# Patient Record
Sex: FEMALE | Race: WHITE
[De-identification: names, ages, dates, MRNs, and addresses within clinical notes are randomized per-mention and may not be internally consistent; named-entity substitution may affect disease eponyms.]

---

## 2018-05-04 ENCOUNTER — HOSPITAL ENCOUNTER (EMERGENCY)
Dept: HOSPITAL 68 - ERH | Age: 83
End: 2018-05-04
Payer: COMMERCIAL

## 2018-05-04 VITALS — BODY MASS INDEX: 27.21 KG/M2 | HEIGHT: 59 IN | WEIGHT: 135 LBS

## 2018-05-04 VITALS — DIASTOLIC BLOOD PRESSURE: 58 MMHG | SYSTOLIC BLOOD PRESSURE: 120 MMHG

## 2018-05-04 DIAGNOSIS — R07.89: Primary | ICD-10-CM

## 2018-05-04 LAB
ABSOLUTE GRANULOCYTE CT: 4.1 /CUMM (ref 1.4–6.5)
BASOPHILS # BLD: 0 /CUMM (ref 0–0.2)
BASOPHILS NFR BLD: 0.4 % (ref 0–2)
EOSINOPHIL # BLD: 0.1 /CUMM (ref 0–0.7)
EOSINOPHIL NFR BLD: 1.7 % (ref 0–5)
ERYTHROCYTE [DISTWIDTH] IN BLOOD BY AUTOMATED COUNT: 14 % (ref 11.5–14.5)
GRANULOCYTES NFR BLD: 68.3 % (ref 42.2–75.2)
HCT VFR BLD CALC: 41.7 % (ref 37–47)
LYMPHOCYTES # BLD: 1.3 /CUMM (ref 1.2–3.4)
MCH RBC QN AUTO: 28.1 PG (ref 27–31)
MCHC RBC AUTO-ENTMCNC: 32.2 G/DL (ref 33–37)
MCV RBC AUTO: 87.2 FL (ref 81–99)
MONOCYTES # BLD: 0.4 /CUMM (ref 0.1–0.6)
PLATELET # BLD: 223 /CUMM (ref 130–400)
PMV BLD AUTO: 8.5 FL (ref 7.4–10.4)
RED BLOOD CELL CT: 4.78 /CUMM (ref 4.2–5.4)
WBC # BLD AUTO: 6 /CUMM (ref 4.8–10.8)

## 2018-05-04 NOTE — RADIOLOGY REPORT
EXAMINATION:
XR PORTABLE CHEST
 
CLINICAL INFORMATION:
Chest discomfort.
 
COMPARISON:
Chest radiograph 02/14/2008.
 
TECHNIQUE:
Portable frontal view of the chest was obtained.
 
FINDINGS:
There is elevation of the right hemidiaphragm. There is mild bibasilar
atelectasis. There is no dense consolidation, edema, effusion, or
pneumothorax. The heart is normal in size. The osseous structures are grossly
intact. Numerous surgical clips are seen in the right axilla and breast.
 
IMPRESSION:
No active disease in the chest.

## 2018-06-07 ENCOUNTER — HOSPITAL ENCOUNTER (OUTPATIENT)
Dept: HOSPITAL 68 - ERH | Age: 83
Setting detail: OBSERVATION
LOS: 1 days | End: 2018-06-08
Attending: EMERGENCY MEDICINE | Admitting: EMERGENCY MEDICINE
Payer: COMMERCIAL

## 2018-06-07 VITALS — HEIGHT: 59 IN | BODY MASS INDEX: 25.2 KG/M2 | WEIGHT: 125 LBS

## 2018-06-07 DIAGNOSIS — R53.1: ICD-10-CM

## 2018-06-07 DIAGNOSIS — Z79.82: ICD-10-CM

## 2018-06-07 DIAGNOSIS — Z87.891: ICD-10-CM

## 2018-06-07 DIAGNOSIS — F03.90: ICD-10-CM

## 2018-06-07 DIAGNOSIS — R07.9: Primary | ICD-10-CM

## 2018-06-07 DIAGNOSIS — R42: ICD-10-CM

## 2018-06-07 LAB
ABSOLUTE GRANULOCYTE CT: 5.5 /CUMM (ref 1.4–6.5)
APTT BLD: 30 SEC (ref 25–37)
BASOPHILS # BLD: 0 /CUMM (ref 0–0.2)
BASOPHILS NFR BLD: 0.3 % (ref 0–2)
EOSINOPHIL # BLD: 0.1 /CUMM (ref 0–0.7)
EOSINOPHIL NFR BLD: 0.7 % (ref 0–5)
ERYTHROCYTE [DISTWIDTH] IN BLOOD BY AUTOMATED COUNT: 14.9 % (ref 11.5–14.5)
GRANULOCYTES NFR BLD: 76.5 % (ref 42.2–75.2)
HCT VFR BLD CALC: 39.9 % (ref 37–47)
LYMPHOCYTES # BLD: 1.1 /CUMM (ref 1.2–3.4)
MCH RBC QN AUTO: 28.5 PG (ref 27–31)
MCHC RBC AUTO-ENTMCNC: 32.9 G/DL (ref 33–37)
MCV RBC AUTO: 86.4 FL (ref 81–99)
MONOCYTES # BLD: 0.5 /CUMM (ref 0.1–0.6)
PLATELET # BLD: 226 /CUMM (ref 130–400)
PMV BLD AUTO: 8.4 FL (ref 7.4–10.4)
PROTHROMBIN TIME: 11.7 SEC (ref 9.4–12.5)
RED BLOOD CELL CT: 4.61 /CUMM (ref 4.2–5.4)
WBC # BLD AUTO: 7.2 /CUMM (ref 4.8–10.8)

## 2018-06-07 PROCEDURE — G0378 HOSPITAL OBSERVATION PER HR: HCPCS

## 2018-06-07 NOTE — RADIOLOGY REPORT
EXAMINATION:
XR CHEST
 
CLINICAL INFORMATION:
Chest pain.
 
COMPARISON:
Chest x-ray 05/04/2018
 
TECHNIQUE:
2 views of the chest were obtained.
 
FINDINGS:
Lungs are clear. No pulmonary vascular congestion. There is no pleural
effusion. The heart size is normal. The cardiac and mediastinal contours are
normal. There are calcifications of the thoracic aorta. There are multilevel
degenerative changes of dorsal spine.
There are surgical clips in the right axilla.
 
IMPRESSION:
No acute abnormality of the chest.

## 2018-06-07 NOTE — CT SCAN REPORT
EXAMINATION:
CT HEAD WITHOUT CONTRAST
 
CLINICAL INFORMATION:
Rule out ICH. Left-sided facial droop.
 
COMPARISON:
None.
 
TECHNIQUE:
Contiguous axial imaging was performed from the skull base to vertex without
intravenous administration of contrast.
 
DLP:
12 mGy-cm.
 
FINDINGS:
There is no intracranial hemorrhage, large infarction, or mass lesion. There
is no dense vessel sign or obscuration of the basal ganglia or insular
ribbon. There is no extra-axial collection. There is moderate scattered
hypoattenuation in the bilateral cerebral white matter, which is nonspecific
but likely reflects small vessel disease. There is mild diffuse brain
parenchymal volume loss with prominence of the ventricles and sulci.
 
The visualized paranasal sinuses are clear. The mastoids and middle ear
cavities are clear. There are atherosclerotic calcification of the carotid
siphons and vertebral arteries.
 
IMPRESSION:
- No CT evidence of acute large territory infarction or hemorrhage.
- Moderate small vessel ischemic changes and mild diffuse brain parenchymal
volume loss.

## 2018-06-08 VITALS — DIASTOLIC BLOOD PRESSURE: 72 MMHG | SYSTOLIC BLOOD PRESSURE: 138 MMHG

## 2018-12-08 NOTE — ED GENERAL ADULT
**See Addendum**
History of Present Illness
 
General
Chief Complaint: Dizziness
Stated Complaint: "CHEST FEELS HEAVY" DIZZINESS & CONFUSED
Source: patient
Exam Limitations: no limitations
 
Vital Signs & Intake/Output
Vital Signs & Intake/Output
 Vital Signs
 
 
Date Time Temp Pulse Resp B/P B/P Pulse O2 O2 Flow FiO2
 
     Mean Ox Delivery Rate 
 
 0931 96.7 53 18 140/62  98 Room Air  
 
 0735 98.3 56 18 165/72  96 Room Air  
 
 0008 97.9 56 18 141/64  96   
 
 1702  61 20 155/65  99 Room Air  
 
 1539       Room Air  
 
 1504  73 20 188/73  98 Room Air  
 
/ 1439 98.7 67 20 144/74  97 Room Air  
 
 
 ED Intake and Output
 
 
  0000  1200
 
Intake Total  
 
Output Total  
 
Balance  
 
   
 
Patient 125 lb 
 
Weight  
 
Weight Reported by Patient 
 
Measurement  
 
Method  
 
 
 
Allergies
Coded Allergies:
No Known Drug Allergies (Intermediate, NONE 18)
 
Reconcile Medications
Aspirin (Aspirin*) 81 MG TAB.CHEW   1 TAB PO DAILY PRN HEART  (Reported)
Atorvastatin Calcium 10 MG TABLET   1 TAB PO DAILY CHOLESTEROL  (Reported)
Diltiazem HCl (Diltiazem 24HR ER) 120 MG CAP.ER.24H   1 CAP PO DAILY HEART  (
Reported)
Donepezil HCl 10 MG TABLET   1 TAB PO QPM DEMENTIA  (Reported)
Melatonin 3 MG TABLET   1 TAB PO QPM PRN SLEEP  (Reported)
Memantine HCl (Namenda XR) 28 MG CAP.SPR.24   1 CAP PO DAILY DEMENTIA  (Reported
)
Metoprolol Succinate 25 MG TAB   0.5 TAB PO DAILY HEART  (Reported)
 
Triage Note:
PT TO ED C/O HEAVY FEELING TO CHEST SINCE THIS
AM. PT NOTED WITH LEFT SIDED FACIAL DROOP. C/O
FEELING DIZZY AND CONFUSED. RAEANN VILLAREAL IN EKG [facility]
FOR EVAL.
Triage Nurses Notes Reviewed? yes
Onset: Gradual
Duration: hour(s):
Timing: single episode today
Injury Environment: home
Severity: moderate
HPI:
82YO female with hx of dementia presents to ED complaining of "chest discomfort
" with dizziness and weakness at home.  HPI is limited due to patient's 
dementia.  Patient's brother states that visiting nurse was visiting the patient
and her home earlier today when patient began complaining of dizziness, weakness
, him for.  Brother states that patient often complains of chest discomfort 
which her cardiologist has told them is related to her aortic stenosis.  Patient
has had valvular disease assessed with cardiologist and was told that they do 
not plan on performing any surgery.  Brother states that the chest discomfort 
she is complaining of a similar to previous episodes.  They deny fall, head 
trauma, vomiting, syncope.  Patient currently denies dizziness and chest pain.
(Alis Reyes)
 
Past History
 
Travel History
Traveled to [place] past 21 day No
 
Medical History
Any Pertinent Medical History? see below for history
Neurological: dementia
EENT: NONE
Cardiovascular: heart valve narrowing
Respiratory: NONE
Gastrointestinal: NONE
Hepatic: NONE
Renal: NONE
Musculoskeletal: NONE
Psychiatric: NONE
Endocrine: NONE
 
Surgical History
Surgical History: non-contributory
 
Psychosocial History
Who do you live with Patient/Self
What is your primary language English
Tobacco Use: Quit >30 days ago
ETOH Use: denies use
Illicit Drug Use: denies illicit drug use
 
Family History
Hx Contributory? No
(Alis Reyes)
 
Review of Systems
 
Review of Systems
Constitutional:
Reports: see HPI. 
EENTM:
Reports: no symptoms. 
Respiratory:
Reports: no symptoms. 
Cardiovascular:
Reports: see HPI. 
GI:
Reports: no symptoms. 
Genitourinary:
Reports: no symptoms. 
Musculoskeletal:
Reports: no symptoms. 
Skin:
Reports: no symptoms. 
Neurological/Psychological:
Reports: see HPI. 
Hematologic/Endocrine:
Reports: no symptoms. 
Immunologic/Allergic:
Reports: no symptoms. 
All Other Systems: Reviewed and Negative
(Alis Reyes)
 
Physical Exam
 
Physical Exam
General Appearance: well developed/nourished, no apparent distress, alert, awake
Head: atraumatic, normal appearance
Eyes:
Bilateral: normal appearance. 
Ears, Nose, Throat: hearing grossly normal
Neck: normal inspection, supple, full range of motion
Respiratory: no respiratory distress, corse breath sounds bilateral lower lobes
Cardiovascular: regular rate/rhythm, normal peripheral pulses
Peripheral Pulses:
2+ radial (R), 2+ radial (L)
Gastrointestinal: normal bowel sounds, soft, non-tender, no organomegaly
Back: normal inspection, normal range of motion
Extremities: normal inspection, normal range of motion
Neurologic/Psych: awake, alert, oriented x 3, left sided facial droop involving 
forehead, otherwise neurologically intact, strength 5/5 equal bilateral upper 
extremities
Skin: intact, normal color, warm/dry
 
Core Measures
ACS in differential dx? Yes
CVA/TIA Diagnosis: No
Sepsis Present: No
Sepsis Focused Exam Completed? No
(Katerina CARY,Alis Huffman)
 
Progress
Differential Diagnoses
I considered the following diagnoses in my evaluation of the patient: [Acute 
coronary syndrome, aortic stenosis, CVA/TIA, Bell's palsy, benign positional 
vertigo, ICH, electrolyte abnormality, anemia]
 
Plan of Care:
 Orders
 
 
Procedure Date/time Status
 
Intake & Output  0000 Active
 
Regular Diet  D Active
 
PT Evaluate & Treat 1942 Active
 
CASE MANAGEMENT CONSULT 1942 Active
 
OXYGEN SETUP (GEN) 1914 Active
 
Saline Lock 1914 Active
 
Place in observation 1914 Active
 
Patient Data 1914 Active
 
Vital Signs 1914 Active
 
Activity/Ambulation 1914 Active
 
Code Status 1914 Active
 
TROPONIN LEVEL  1822 Complete
 
EKG  1822 Active
 
FingerStick- Glucose  1527 Active
 
NIH Stroke Scale  1500 Active
 
TROPONIN LEVEL  1437 Complete
 
PARTIAL THROMBOPLASTIN TIME  143 Complete
 
PROTHROMBIN TIME  1437 Complete
 
COMPREHENSIVE METABOLIC PANEL  1437 Complete
 
CBC WITHOUT DIFFERENTIAL  1437 Complete
 
EKG  1429 Active
 
 
 Laboratory Tests
 
 
 
18 1839:
Troponin I < 0.01
 
18 1445:
Anion Gap 12, Estimated GFR > 60, BUN/Creatinine Ratio 28.6  H, Glucose 119  H, 
Calcium 9.3, Total Bilirubin 0.5, AST 24, ALT 23, Alkaline Phosphatase 65, 
Troponin I < 0.01, Total Protein 6.7, Albumin 3.9, Globulin 2.8, Albumin/
Globulin Ratio 1.4, PT 11.7, INR 1.07, APTT 30, CBC w Diff NO MAN DIFF REQ, RBC 
4.61, MCV 86.4, MCH 28.5, MCHC 32.9  L, RDW 14.9  H, MPV 8.4, Gran % 76.5  H, 
Lymphocytes % 16.0  L, Monocytes % 6.5, Eosinophils % 0.7, Basophils % 0.3, 
Absolute Granulocytes 5.5, Absolute Lymphocytes 1.1  L, Absolute Monocytes 0.5, 
Absolute Eosinophils 0.1, Absolute Basophils 0
 
When patient arrives in emergency department it was noted that she had left 
facial droop involving the eyebrow.  I saw the patient in triage at this time.  
Patient states she did not know she had a facial droop.  For this reason we sent
her to head CT scan.  Head CT scan is within normal limits.
 
After discussing the patient's history with her brother he reports a history of 
Bell's palsy with left-sided facial droop for the past 10 years.  This and this 
finding was suspicion for acute intracranial pathology/CVA.
 
Patient's EKG is stable compared to previous studies, troponin enzymes negative.
 Patient's labs are within normal limits.  She is ambulatory here with a steady 
gait and without dizziness.  She has no orthostatic hypotension.  Others are 
concerned because she lives at home alone, he does not feel it is safe for her 
to go home given her symptoms from earlier today.  Patient also has dementia at 
baseline and gives a poor history.  For this reason patient to stay in emergency
department for serial EKGs, troponins, physical therapy evaluation tomorrow.  
Discussed with case management who recommended ED observation.
 
The patient was signed out to Dr. Raymundo for ED obs.
Diagnostic Imaging:
Viewed by Me: Radiology Read, CT Scan.  Discussed w/RAD: Radiology Read, CT 
Scan. 
Radiology Impression: PATIENT: SERENITY TRUJILLO  MEDICAL RECORD NO: [id number] 
PRESENT AGE: 83  PATIENT ACCOUNT NO: [id number] : [birth year]  LOCATION: [facility] 
ORDERING PHYSICIAN: Alis CARY     SERVICE DATE:  EXAM 
TYPE: CAT - CT HEAD WO IV CONTRAST EXAMINATION: CT HEAD WITHOUT CONTRAST 
CLINICAL INFORMATION: Rule out ICH. Left-sided facial droop. COMPARISON: None. 
TECHNIQUE: Contiguous axial imaging was performed from the skull base to vertex 
without intravenous administration of contrast. DLP: 12 mGy-cm. FINDINGS: There 
is no intracranial hemorrhage, large infarction, or mass lesion. There is no 
dense vessel sign or obscuration of the basal ganglia or insular ribbon. There 
is no extra-axial collection. There is moderate scattered hypoattenuation in the
bilateral cerebral white matter, which is nonspecific but likely reflects small 
vessel disease. There is mild diffuse brain parenchymal volume loss with 
prominence of the ventricles and sulci. The visualized paranasal sinuses are 
clear. The mastoids and middle ear cavities are clear. There are atherosclerotic
calcification of the carotid siphons and vertebral arteries. IMPRESSION: - No CT
evidence of acute large territory infarction or hemorrhage. - Moderate small 
vessel ischemic changes and mild diffuse brain parenchymal volume loss. DICTATED
BY: Carolyn Grenee MD  DATE/TIME DICTATED:18 :
RAD.IRWIN  DATE/TIME TRANSCRIBED:18 CONFIDENTIAL, DO NOT COPY 
WITHOUT APPROPRIATE AUTHORIZATION.  <Electronically signed in Other Vendor 
System>                                                                         
              SIGNED BY: Carolyn Greene MD 18 1512
CXR Impression: PATIENT: SERENITY TRUJILLO  MEDICAL RECORD NO: [id number] PRESENT 
AGE: 83  PATIENT ACCOUNT NO: [id number] : [birth year]  LOCATION: [facility] ORDERING 
PHYSICIAN: Alis CARY     SERVICE DATE:  EXAM TYPE: RAD -
XRY-CHEST XRAY, TWO VIEWS EXAMINATION: XR CHEST CLINICAL INFORMATION: Chest 
pain. COMPARISON: Chest x-ray 2018 TECHNIQUE: 2 views of the chest were 
obtained. FINDINGS: Lungs are clear. No pulmonary vascular congestion. There is 
no pleural effusion. The heart size is normal. The cardiac and mediastinal 
contours are normal. There are calcifications of the thoracic aorta. There are 
multilevel degenerative changes of dorsal spine. There are surgical clips in the
right axilla. IMPRESSION: No acute abnormality of the chest. DICTATED BY: Emerson Shin MD  DATE/TIME DICTATED:18 :RAD.IRWIN  DATE/
TIME TRANSCRIBED:18 CONFIDENTIAL, DO NOT COPY WITHOUT APPROPRIATE 
AUTHORIZATION.  <Electronically signed in Other Vendor System>                  
                                                                     SIGNED BY: 
Emerson Shin MD 18
Initial ED EKG: sinus rhythm @59bpm, PACs, nonspecific ST changes
Prior EKG: unchanged (18)
Hand-Off
   Endorsed To:
Clarence Raymundo MD
   Endorsed Time: 
   Pending: EKG, labs (ED obs)
(Katerina CARY,Alis Huffman)
Hand-Off
   Endorsed To:
Curry Jerome MD
   Endorsed Time: 700
   Pending: other (case mgmt, pt)
(Clarence Raymundo MD)
Comments:
2018 9:16:39 AM patient signed out to me by Dr. Rayumndo at shift change over.
 Serenity is currently without complaint and is eating breakfast.  I have 
discussed her case with case management regarding the possibility of homecare 
enhancement.  Disposition is pending.
 
2018 10:05:29 AM I have reevaluated Serneity.  She has no complaint at this 
time.  She is oriented to person place and time.  I suspect she is oriented to 
the current  although couldn't state his name 
specifically.  She states she lives in her own home by herself but is planning 
on converting over to assisted living with the help of her brother.  She states 
her brother should be available to pick her up.  Although the patient has a 
history of dementia and is currently taking Namenda, I feel she is competent for
medical decision making at this time.  She has declined evaluation by case 
management for the possibility of a home health aide.  Overall I feel she is 
stable for discharge.  She has a primary care physician with whom she can follow
-up.
(Queenie GENTILE,Curry HORTA)
 
Departure
 
Departure
Condition: Stable
Clinical Impression
Primary Impression: Chest pain
Qualifiers:  Chest pain type: unspecified Qualified Code: R07.9 - Chest pain, 
unspecified
Secondary Impressions: Dizziness, Weakness
Referrals:
Harman Barnes MD (PCP/Family)
 
Departure Forms:
Customer Survey
General Discharge Information
 
Observation Note
Spoke With:
Clarence Raymundo MD
Physician Advisor Notified: CURRY LOUIS DO
Place Patient In: ED Observation
Rationale for Observation:
My rational for observation is as follows [patient has chest pain requiring 
serial EKGs, troponins, telemetry monitoring, weakness and dizziness requiring 
physical therapy evaluation, premature discharge medically unsafe.].
 
(Katerina CARY,Alis Huffman)
 
PA/NP Co-Sign Statement
Statement:
ED Attending supervision documentation-
 
x I saw and evaluated the patient. I have also reviewed all the pertinent lab 
results and diagnostic results. I agree with the findings and the plan of care 
as documented in the PA's/NP's documentation. 
 
[] I have reviewed the ED Record and agree with the PA's/NP's documentation.
 
[] Additions or exceptions (if any) to the PAs/NP's note and plan are 
summarized below:
[]
 
(Clarence Raymundo MD)
 
Departure
Disposition: HOME OR SELF CARE
Additional Instructions:
Your emergency department evaluation does not reveal any significant findings.  
Follow-up with your primary care physician on Monday for reevaluation.  Return 
if any concerns or sudden worsening.
 
Please note that there might be incidental findings in your evaluation that are 
unrelated to the current emergency department visit.  Please notify your primary
care doctor about this emergency department visit in order to obtain and review 
all of the testing performed so that these incidental findings can be monitored 
as needed.
 
If you had an x-ray performed, please understand that some fractures or other 
findings may not be seen on the initial set of x-rays.  If your symptoms persist
you might need a repeat set of x-rays to check for such a fracture.
 
If you had a laceration evaluated, please understand that foreign bodies such as
glass or wood may not be visible to the naked eye or on plain x-rays.  If the 
wound becomes red, swollen, increasingly more painful or if there is any 
drainage from the wound, please have it reevaluated by a physician for the 
possibility of a retained foreign body.
 
*****If you're unable to follow up as outlined in the discharge instructions 
please return to the emergency department.******
 
Thank you for choosing the [facility] Emergency Department for your care.
It was a pleasure to serve you today.
 
Curry Jerome M.D.
[facility]
 
(Queenie GENTILE,Curry HORTA)
 
Critical Care Note
 
Critical Care Note
Critical Care Time: non-applicable
(Katerina CARY,Alis Huffman)
 
ED Attending Observation
Initial Observation Note:
I have seen and personally examined SERENITY TRUJILLO 
on 18 at 1914. 
 
I agree with the current emergency department documentation.  The disposition 
(admission or discharge) is uncertain at this time, she needs a period of 
observation for the following reason(s): chest pain, dementia
 
The ED Nurse caring for this patient has been personally informed as to what the
patient is being observed for.
 
(Breanne GENTILE,Clarence)
0 = independent